# Patient Record
Sex: FEMALE | Race: WHITE | NOT HISPANIC OR LATINO | Employment: OTHER | URBAN - METROPOLITAN AREA
[De-identification: names, ages, dates, MRNs, and addresses within clinical notes are randomized per-mention and may not be internally consistent; named-entity substitution may affect disease eponyms.]

---

## 2017-04-10 PROBLEM — I20.0 CRESCENDO ANGINA: Status: ACTIVE | Noted: 2017-04-10

## 2017-04-10 PROBLEM — I25.10 CORONARY ARTERY DISEASE DUE TO CALCIFIED CORONARY LESION: Status: ACTIVE | Noted: 2017-04-10

## 2017-04-10 PROBLEM — I25.84 CORONARY ARTERY DISEASE DUE TO CALCIFIED CORONARY LESION: Status: ACTIVE | Noted: 2017-04-10

## 2017-08-04 PROBLEM — J45.40 MODERATE PERSISTENT ASTHMA WITHOUT COMPLICATION: Status: ACTIVE | Noted: 2017-08-04

## 2017-09-18 PROBLEM — K43.9 VENTRAL HERNIA WITHOUT OBSTRUCTION OR GANGRENE: Status: ACTIVE | Noted: 2017-09-18

## 2018-03-23 PROBLEM — Z51.81 ENCOUNTER FOR MONITORING ANTIPLATELET THERAPY: Status: ACTIVE | Noted: 2018-03-23

## 2018-03-23 PROBLEM — C34.91 MALIGNANT NEOPLASM OF RIGHT LUNG: Status: ACTIVE | Noted: 2018-03-23

## 2018-03-23 PROBLEM — Z79.02 ENCOUNTER FOR MONITORING ANTIPLATELET THERAPY: Status: ACTIVE | Noted: 2018-03-23

## 2019-08-21 ENCOUNTER — OFFICE VISIT (OUTPATIENT)
Dept: OTOLARYNGOLOGY | Facility: CLINIC | Age: 79
End: 2019-08-21
Payer: MEDICARE

## 2019-08-21 VITALS — WEIGHT: 173.06 LBS | BODY MASS INDEX: 30.66 KG/M2 | HEIGHT: 63 IN

## 2019-08-21 DIAGNOSIS — I25.84 CORONARY ARTERY DISEASE DUE TO CALCIFIED CORONARY LESION: ICD-10-CM

## 2019-08-21 DIAGNOSIS — I50.32 CHRONIC DIASTOLIC CONGESTIVE HEART FAILURE: ICD-10-CM

## 2019-08-21 DIAGNOSIS — J37.0 LARYNGITIS SICCA: Primary | ICD-10-CM

## 2019-08-21 DIAGNOSIS — J45.40 MODERATE PERSISTENT ASTHMA WITHOUT COMPLICATION: ICD-10-CM

## 2019-08-21 DIAGNOSIS — I25.10 CORONARY ARTERY DISEASE DUE TO CALCIFIED CORONARY LESION: ICD-10-CM

## 2019-08-21 PROCEDURE — 99999 PR PBB SHADOW E&M-EST. PATIENT-LVL III: CPT | Mod: PBBFAC,,, | Performed by: OTOLARYNGOLOGY

## 2019-08-21 PROCEDURE — 99204 OFFICE O/P NEW MOD 45 MIN: CPT | Mod: 25,S$PBB,, | Performed by: OTOLARYNGOLOGY

## 2019-08-21 PROCEDURE — 31575 DIAGNOSTIC LARYNGOSCOPY: CPT | Mod: PBBFAC,PO | Performed by: OTOLARYNGOLOGY

## 2019-08-21 PROCEDURE — 99204 PR OFFICE/OUTPT VISIT, NEW, LEVL IV, 45-59 MIN: ICD-10-PCS | Mod: 25,S$PBB,, | Performed by: OTOLARYNGOLOGY

## 2019-08-21 PROCEDURE — 99213 OFFICE O/P EST LOW 20 MIN: CPT | Mod: PBBFAC,PO | Performed by: OTOLARYNGOLOGY

## 2019-08-21 PROCEDURE — 99999 PR PBB SHADOW E&M-EST. PATIENT-LVL III: ICD-10-PCS | Mod: PBBFAC,,, | Performed by: OTOLARYNGOLOGY

## 2019-08-21 PROCEDURE — 31575 DIAGNOSTIC LARYNGOSCOPY: CPT | Mod: S$PBB,,, | Performed by: OTOLARYNGOLOGY

## 2019-08-21 PROCEDURE — 31575 PR LARYNGOSCOPY, FLEXIBLE; DIAGNOSTIC: ICD-10-PCS | Mod: S$PBB,,, | Performed by: OTOLARYNGOLOGY

## 2019-08-21 NOTE — LETTER
August 21, 2019      Mike Kruger MD  [address]  [address]  [facility] [address]           [place] [facility]  [address] [facility] [address] [place] [address]  Phone: [phone]  Fax: [phone]          Patient: Sharon Mcneill   MR Number: [id number]   YOB: 1940   Date of Visit: 8/21/2019       Dear Aaareferral Self:    Thank you for referring Sharon Mcneill to me for evaluation. Attached you will find relevant portions of my assessment and plan of care.    If you have questions, please do not hesitate to call me. I look forward to following Sharon Mcneill along with you.    Sincerely,    Homar Lizarraga MD    Enclosure  CC:  No Recipients    If you would like to receive this communication electronically, please contact [email or web address] or [phone] to request more information on [employer] Link access.    For providers and/or their staff who would like to refer a patient to [facility], please contact us through our one-stop-shop provider referral line, [facility], at [phone].    If you feel you have received this communication in error or would no longer like to receive these types of communications, please e-mail [email or web address]

## 2019-08-21 NOTE — PROGRESS NOTES
Subjective:       Patient ID: Sharon Mcneill is a 78 y.o. female.    Chief Complaint: Hoarse and Cough    Sharon is here for hoarseness.   Length of symptoms: has been going on for many years, has been fluctuating over this time.   She reports chronic hoarseness which will come and go. Some days better than others. She describes a strained, raspy quality to her voice. There are times when she has normal voice quality, but becoming less frequent. Has gotten worse with time.   She reports significant dryness which she feels may affect her vocal cords.  She has not used salivary stimulants in the past.  She does report times where her mouth and throat is so dry that she has trouble opening her mouth and  her lips from each other. ST in the past (5-6 yrs ago) with no improvement.   She had history of lung resection in the 90s. She has seen ENT in the past.   Onset: Acute - began following her lung surgery,   She does get periodic COPD exacerbations     Pertinent meds: Lasix, chronic O2, Plavix  Pertinent medical issues:  COPD, CAD, hypertension, CHF, previous lung cancer  Previous surgery:  Tonsillectomy, adenoidectomy, cardiac stent, cardiac surgery    Social History     Tobacco Use   Smoking Status Never Smoker   Smokeless Tobacco Never Used     Social History     Substance and Sexual Activity   Alcohol Use No          Review of Systems   Constitutional: Negative for activity change and appetite change.   Eyes: Negative for discharge.   Respiratory:+ difficulty breathing and wheezing   Cardiovascular: Negative for chest pain.   Gastrointestinal: Negative for abdominal distention and abdominal pain.   Endocrine: Negative for cold intolerance and heat intolerance.   Genitourinary: Negative for dysuria.   Musculoskeletal: Negative for gait problem and joint swelling.   Skin: Negative for color change and pallor.   Neurological: Negative for syncope and weakness.   Psychiatric/Behavioral: Negative for agitation  and confusion.     Objective:        Constitutional:   She is oriented to person, place, and time. She appears well-developed and well-nourished. She appears alert. She is active.   Nasal cannula in place Normal speech.      Head:  Normocephalic and atraumatic. Head is without TMJ tenderness. No scars. Salivary glands normal.  Facial strength is normal.    Raspy strained quality to voice     Ears:    Right Ear: No drainage or swelling. No middle ear effusion.   Left Ear: No drainage or swelling.  No middle ear effusion.     Nose:  No mucosal edema, rhinorrhea or sinus tenderness. No turbinate hypertrophy.      Mouth/Throat  Oropharynx clear and moist without lesions or asymmetry, normal uvula midline and mirror exam normal. Normal dentition. Xerostomia (Severe) present. No uvula swelling, lacerations, trismus or mucous membrane lesions. No oropharyngeal exudate. Tonsillar erythema, tonsillar exudate.    Strong gag      Neck:  Full range of motion with neck supple and no adenopathy. Thyroid tenderness is present. No tracheal deviation, no edema, no erythema, normal range of motion, no stridor, no crepitus and no neck rigidity present. No thyroid mass present.     Cardiovascular:   Intact distal pulses and normal pulses.      Pulmonary/Chest:   Effort normal and breath sounds normal. No stridor.     Psychiatric:   Her speech is normal and behavior is normal. Her mood appears not anxious. Her affect is not labile.     Neurological:   She is alert and oriented to person, place, and time. No sensory deficit.     Skin:   No abrasions, lacerations, lesions, or rashes. No abrasion and no bruising noted.         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 Diagnoses of Coronary artery disease, h/o stent 2010. Moderate dz 2017 angio, Chronic diastolic congestive heart failure, and Moderate persistent asthma without complication were also pertinent to this visit.      Post-procedure diagnosis: same    Procedure: Flexible fiberoptic laryngoscopy    Surgeon: Homar Lizarraga MD    Anesthesia: 2% Lidocaine with Phenylephrine topical    Risks, benefits, and alternatives of the procedure were discussed with the patient, and the patient consented to the fiberoptic examination.  We applied a topical nasal decongestant and analgesic.  After adequate anesthesia was obtained, the flexible fiberoptic scope was passed through the nasal cavity. The entire pharynx (nasopharynx to hypopharynx) and the larynx were visualized. At the end of the examination, the scope was removed. The patient tolerated the procedure well with no complications.     Findings:  -     Laryngeal mucosa is extremely dry  -     Post-cricoid region: none  -     Lingual tonsils have no hypertrophy  -     Adenoids have no  hypertrophy  -     Right vocal fold: normal mobility     mass/lesion: none  -     Left vocal fold: normal mobility     mass/lesion: none  -     Other findings: increased thick mucous in nasopharynx and overlying cords.  Decreased vibration of vocal folds during prolonged phonation.      Assessment:       1. Laryngitis sicca    2. Coronary artery disease, h/o stent 2010. Moderate dz 2017 angio    3. Chronic diastolic congestive heart failure    4. Moderate persistent asthma without complication          Plan:       Suspect dryness contributing to majority of her issues.  Discussed this is related to Lasix, natural aging process with dryness, and supplemental oxygen use.  She also limits her water intake as result of her heart failure.  We did discuss initiation of conservative measures including Biotene and saltwater gargles.  I reassured her that there are no lesions or abnormalities of her larynx.  She is not having any significant dysphagia related to this.  With persistent symptoms, we could consider pilocarpine; however, this may exacerbate her COPD/asthma.  I would be cautious with using this, and  I discussed this with her.    Follow-up 3 months

## 2019-08-21 NOTE — PATIENT INSTRUCTIONS
I recommend warm salt water gargles twice daily    I also recommend using Biotene, which helps keep the moisture in the throat, up to three times per day.     Mucous Management    Several factors can cause the sensation of increased mucous in the throat, including dryness, acid reflux, and increased mucous production from allergies or chronic sinus drainage. There is also some evidence that added sugars or processed sugars in the diet (not the kind that occur naturally in honey or ripe fruit) can increase mucus, as well as too much dairy. To avoid these refined carbohydrates, on food labels, watch out for wheat flour (also called white, refined or enriched flour) on the ingredients list. The below website has several good options for mucous management.    [email or web address]    Some recommendations:    -Water, water, water  -Cut back on caffeine  -Steam treatments during the day (Personal Steam Inhalers are available at most pharmacies and drugstores. [employer] sells one called eCoast's steam inhaler that people like. A cheaper alternative is a bowl of warm water with a towel over your head. You can breathe in the steam for a couple of minutes, especially if you are about to use your voice a lot, or when you are feeling particularly dry in your throat or mouth.)  -Humidifier at night. Put this right next to your head so that the mist falls on your face.  -Prevention of acid reflux by avoiding late-night eating (nothing 3 hours before laying down at night), greasy and spicy foods, and acidic foods  -Mucinex (over-the- counter, generic name guaifenesin. Buy the kind without a cough  suppressant or decongestant added). This medicine doesn't work well if you are not well  hydrated, so drink plenty of water on days when you take it.  -Nasal saline irrigations with NeilMed rinse kit, Neti Pot, Active Sinus, or Nasopure irrigation bottles (available in any pharmacy or grocery store,  "and all available on amazon)  -Avoid the things you are allergic to if you have allergies (use dust mite covers on your bed, wash your hair at night instead of morning if you have pollen allergies)  -Cuadra's Breezers (sugar-free), Grether's black currant pastilles, and Entertainer's Secret Throat Relief can all help dry mouth and thickened mucous. See website above for details.  -If you smoke, stop.  -Low- sugar diet. The FDA is changing the way they label foods soon so that it's easier to tell when sugars occur naturally in the food (which is not harmful to our health) vs when processed sugars or syrups have been added to the food (which may increase inflammation in the body).  In the meantime, you can google the "[employer] diet" to get an idea of foods that you might want to avoid.  -Dairy - some patients find that eating a lot of dairy products worsens their mucus.  -Sleep apnea - if you have sleep apnea and don';t treat it, consider starting up your CPAP again (and be sure to use the humidification). Snoring and struggling to keep your airway open all night long is traumatizing to the lining of your throat and can increase irritation.    "

## 2019-11-11 PROBLEM — R55 VASOVAGAL SYNCOPE: Status: ACTIVE | Noted: 2019-11-11

## 2019-11-11 PROBLEM — R07.81 RIB PAIN ON LEFT SIDE: Status: ACTIVE | Noted: 2019-11-11

## 2019-11-11 PROBLEM — I20.89 ANGINAL CHEST PAIN AT REST: Status: ACTIVE | Noted: 2019-11-11

## 2019-11-11 PROBLEM — I20.0 UNSTABLE ANGINA: Status: ACTIVE | Noted: 2019-11-11

## 2019-11-12 PROBLEM — S92.901D CLOSED FRACTURE OF RIGHT FOOT WITH ROUTINE HEALING: Status: ACTIVE | Noted: 2019-11-12

## 2020-12-30 PROBLEM — J96.20 ACUTE ON CHRONIC RESPIRATORY FAILURE: Status: ACTIVE | Noted: 2020-12-30

## 2020-12-30 PROBLEM — J12.82 PNEUMONIA DUE TO COVID-19 VIRUS: Status: ACTIVE | Noted: 2020-12-30

## 2020-12-30 PROBLEM — U07.1 PNEUMONIA DUE TO COVID-19 VIRUS: Status: ACTIVE | Noted: 2020-12-30

## 2021-01-01 PROBLEM — E87.6 HYPOKALEMIA: Status: ACTIVE | Noted: 2021-01-01

## 2021-01-02 PROBLEM — E83.39 HYPOPHOSPHATEMIA: Status: ACTIVE | Noted: 2021-01-02

## 2021-01-24 PROBLEM — Z01.818 PREOP EXAM FOR INTERNAL MEDICINE: Status: ACTIVE | Noted: 2021-01-24
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
## 2021-01-24 PROBLEM — S72.90XA FEMUR FRACTURE: Status: ACTIVE | Noted: 2021-01-24

## 2021-01-24 PROBLEM — S72.91XA FEMUR FRACTURE, RIGHT: Status: ACTIVE | Noted: 2021-01-24

## 2021-01-25 PROBLEM — D62 ACUTE BLOOD LOSS ANEMIA: Status: ACTIVE | Noted: 2021-01-25

## 2021-01-25 PROBLEM — Z01.818 PREOP EXAMINATION: Status: RESOLVED | Noted: 2021-01-24 | Resolved: 2021-01-25

## 2021-04-30 PROBLEM — R11.2 INTRACTABLE NAUSEA AND VOMITING: Status: ACTIVE | Noted: 2021-04-30

## 2021-04-30 PROBLEM — Z71.89 ACP (ADVANCE CARE PLANNING): Status: ACTIVE | Noted: 2021-04-30

## 2021-04-30 PROBLEM — N17.9 AKI (ACUTE KIDNEY INJURY): Status: ACTIVE | Noted: 2021-04-30

## 2021-05-01 PROBLEM — Z71.89 ACP (ADVANCE CARE PLANNING): Status: RESOLVED | Noted: 2021-04-30 | Resolved: 2021-05-01

## 2021-05-01 PROBLEM — R11.10 INTRACTABLE VOMITING: Status: ACTIVE | Noted: 2021-05-01
